# Patient Record
Sex: FEMALE | Race: WHITE | NOT HISPANIC OR LATINO | URBAN - METROPOLITAN AREA
[De-identification: names, ages, dates, MRNs, and addresses within clinical notes are randomized per-mention and may not be internally consistent; named-entity substitution may affect disease eponyms.]

---

## 2018-07-31 ENCOUNTER — NEW PATIENT (OUTPATIENT)
Dept: URBAN - METROPOLITAN AREA CLINIC 73 | Facility: CLINIC | Age: 38
End: 2018-07-31

## 2018-07-31 DIAGNOSIS — H33.021: ICD-10-CM

## 2018-07-31 DIAGNOSIS — H43.813: ICD-10-CM

## 2018-07-31 DIAGNOSIS — H35.412: ICD-10-CM

## 2018-07-31 PROCEDURE — G8427 DOCREV CUR MEDS BY ELIG CLIN: HCPCS

## 2018-07-31 PROCEDURE — 92134 CPTRZ OPH DX IMG PST SGM RTA: CPT

## 2018-07-31 PROCEDURE — 67145 PROPH RTA DTCHMNT PC: CPT

## 2018-07-31 PROCEDURE — 4040F PNEUMOC VAC/ADMIN/RCVD: CPT | Mod: 8P

## 2018-07-31 PROCEDURE — 99244 OFF/OP CNSLTJ NEW/EST MOD 40: CPT

## 2018-07-31 PROCEDURE — 92225 OPHTHALMOSCOPY (INITIAL): CPT

## 2018-07-31 ASSESSMENT — TONOMETRY
OD_IOP_MMHG: 17
OS_IOP_MMHG: 16

## 2018-07-31 ASSESSMENT — VISUAL ACUITY
OD_CC: 3/200
OS_CC: 20/50

## 2018-08-06 ENCOUNTER — SURGERY/PROCEDURE (OUTPATIENT)
Age: 38
End: 2018-08-06

## 2018-08-06 DIAGNOSIS — H33.021: ICD-10-CM

## 2018-08-06 PROCEDURE — 67107 REPAIR DETACHED RETINA: CPT

## 2018-08-07 ENCOUNTER — 1 DAY POST-OP (OUTPATIENT)
Dept: URBAN - METROPOLITAN AREA CLINIC 73 | Facility: CLINIC | Age: 38
End: 2018-08-07

## 2018-08-07 DIAGNOSIS — H33.021: ICD-10-CM

## 2018-08-07 PROCEDURE — 92226 OPHTHALMOSCOPY (SUB): CPT

## 2018-08-07 PROCEDURE — 99024 POSTOP FOLLOW-UP VISIT: CPT

## 2018-08-07 ASSESSMENT — VISUAL ACUITY
OS_SC: 20/30
OS_PH: 20/25
OD_SC: 3/200

## 2018-08-07 ASSESSMENT — TONOMETRY
OS_IOP_MMHG: 17
OD_IOP_MMHG: 19

## 2018-08-14 ENCOUNTER — POST-OP CHECK (OUTPATIENT)
Dept: URBAN - METROPOLITAN AREA CLINIC 73 | Facility: CLINIC | Age: 38
End: 2018-08-14

## 2018-08-14 DIAGNOSIS — H33.021: ICD-10-CM

## 2018-08-14 DIAGNOSIS — H35.412: ICD-10-CM

## 2018-08-14 PROCEDURE — 99024 POSTOP FOLLOW-UP VISIT: CPT

## 2018-08-14 PROCEDURE — 92226 OPHTHALMOSCOPY (SUB): CPT

## 2018-08-14 PROCEDURE — 92134 CPTRZ OPH DX IMG PST SGM RTA: CPT

## 2018-08-14 ASSESSMENT — VISUAL ACUITY
OD_CC: 20/400
OS_CC: 20/30-2
OD_PH: 20/160+1

## 2018-08-14 ASSESSMENT — TONOMETRY
OD_IOP_MMHG: 15
OS_IOP_MMHG: 19

## 2018-09-11 ENCOUNTER — POST-OP CHECK (OUTPATIENT)
Dept: URBAN - METROPOLITAN AREA CLINIC 73 | Facility: CLINIC | Age: 38
End: 2018-09-11

## 2018-09-11 DIAGNOSIS — H33.021: ICD-10-CM

## 2018-09-11 DIAGNOSIS — H35.412: ICD-10-CM

## 2018-09-11 PROCEDURE — 99024 POSTOP FOLLOW-UP VISIT: CPT

## 2018-09-11 PROCEDURE — 92226 OPHTHALMOSCOPY (SUB): CPT

## 2018-09-11 ASSESSMENT — TONOMETRY
OD_IOP_MMHG: 10
OS_IOP_MMHG: 10

## 2018-09-11 ASSESSMENT — VISUAL ACUITY
OD_CC: 20/125-1
OD_PH: 20/100
OS_CC: 20/30-2

## 2018-11-12 ENCOUNTER — HOSPITAL ENCOUNTER (EMERGENCY)
Dept: HOSPITAL 31 - C.ER | Age: 38
Discharge: HOME | End: 2018-11-12
Payer: COMMERCIAL

## 2018-11-12 VITALS — OXYGEN SATURATION: 95 %

## 2018-11-12 VITALS — SYSTOLIC BLOOD PRESSURE: 139 MMHG | DIASTOLIC BLOOD PRESSURE: 96 MMHG | HEART RATE: 72 BPM | RESPIRATION RATE: 17 BRPM

## 2018-11-12 VITALS — BODY MASS INDEX: 27.3 KG/M2

## 2018-11-12 VITALS — TEMPERATURE: 98.9 F

## 2018-11-12 DIAGNOSIS — S63.601A: ICD-10-CM

## 2018-11-12 DIAGNOSIS — Z23: ICD-10-CM

## 2018-11-12 DIAGNOSIS — S60.351A: Primary | ICD-10-CM
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
## 2018-11-12 DIAGNOSIS — Z72.0: ICD-10-CM

## 2018-11-12 DIAGNOSIS — E78.00: ICD-10-CM

## 2018-11-12 DIAGNOSIS — I10: ICD-10-CM

## 2018-11-12 NOTE — C.PDOC
History Of Present Illness





37 yo female come in for evaluation of Right thumb pain, swelling gradually 

developed since yesterday. Pt reports, sustained blunt " non-significant " Right

thumb injury and today woke up with Rt thumb swollen, was unable to remove ring.

Otherwise, pt denies obvious deformity, skin changes, weakness, sensory or 

vascular deficits to injured finger. Ambulate to ED for evaluation, not in any 

apparent distress.


Time Seen by Provider: 11/12/18 08:02


Chief Complaint (Nursing): Abnormal Skin Integrity


History Per: Patient





Past Medical History


Reviewed: Historical Data, Nursing Documentation, Vital Signs


Vital Signs: 





                                Last Vital Signs











Temp  98.9 F   11/12/18 08:04


 


Pulse  91 H  11/12/18 08:04


 


Resp  18   11/12/18 08:04


 


BP      


 


Pulse Ox  95   11/12/18 08:04














- Medical History


PMH: Anxiety, Asthma, HTN, Hypercholesterolemia


   Denies: Diabetes, Hepatitis, HIV, Seizures, Sexually Transmitted Disease


Surgical History: Hernia Repair (2012)





- [facility] Procedures











COLONOSCOPY (07/25/14)


DETOXIFICATION SERVICES FOR SUBSTANCE ABUSE TREATMENT (06/16/16)


GROUP  FOR SUBSTANCE ABUSE TREATMENT, PSYCHOEDUCATION (06/16/16)


TETANUS TOXOID ADMINIST (11/25/14)








Family History: States: Unknown Family Hx





- Social History


Hx Tobacco Use: Yes


Hx Alcohol Use: Yes


Hx Substance Use: No





- Immunization History


Hx Tetanus Toxoid Vaccination: No


Hx Influenza Vaccination: No


Hx Pneumococcal Vaccination: Yes





Review Of Systems


Except As Marked, All Systems Reviewed And Found Negative.


Constitutional: Negative for: Fever, Chills


Musculoskeletal: Positive for: Hand Pain


Skin: Negative for: Rash, Lesions, Bruising


Neurological: Negative for: Weakness, Numbness





Physical Exam





- Physical Exam


Appears: Well, Non-toxic, No Acute Distress


Skin: Normal Color, Warm, No Ecchymosis


Extremity: Normal ROM (Right thumb), Tenderness (diffuse to Righ thumb), 

Capillary Refill (less than 2sec to Right thumb), No Deformity, Swelling 

(diffuse right thimb distally to ring), Other (ring noted to Rigt thumb, tight, 

unable to remove)


Pulses: Right Radial: Normal


Neurological/Psych: Oriented x3, Normal Speech, Normal Motor, Normal Sensation, 

Normal Reflexes





ED Course And Treatment


O2 Sat by Pulse Oximetry: 95 (RA)


Pulse Ox Interpretation: Normal





- Other Rad


  ** X-Ray-Right Thumb


X-Ray: Viewed By Me, Read By Radiologist


Interpretation: Date of service: 11/12/2018.  PROCEDURE:  Right Thumb 

radiographs.  HISTORY:  injury.  COMPARISON:  None.  TECHNIQUE:  AP radiograph 

of the right hand, as well as spot oblique and lateral images of thumb were 

obtained.  FINDINGS:  RIGHT THUMB:  Unremarkable right 1st digit without acute 

displaced fracture identified.  Remainder of the right hand (as seen on the AP 

view) grossly unremarkable.  JOINTS:  No dislocation.  SOFT TISSUES:  Soft 

tissue swelling.  No evidence of radiopaque foreign body.  OTHER FINDINGS:  

None.  IMPRESSION:  Soft tissue swelling.  Otherwise unremarkable study as 

above.


Progress Note: Ring was removed with assistance of ring cutter.  Xray of right 

thumb review (-)aacute fx or dislocation.  On re-eval of Right hand; DIANE of 

Right thumb, no neurovascular deficits.  tetanus given.  Pt advised on care.  

ref. to F/u with PMD in 2-3 days for re-eavl.  return if a ny new chnages.





Disposition


Counseled Patient/Family Regarding: Diagnosis, Need For Followup, Rx Given





- Disposition


Referrals: 


Luis Enrique Castro MD [Staff Provider] - 


Angel Pagan MD [Non-Staff] - 


Disposition: HOME/ ROUTINE


Disposition Time: 09:21


Condition: STABLE


Additional Instructions: 


LIght duty to injured finger


Ibuprofen as need for pain


Follow up with PMD in 2-3 days for re-evaluation.


return to Ed if any worsening or new changes.


Instructions:  Finger Sprain (DC), Foreign Body in Skin


Forms:  [facility] (English)





- Clinical Impression


Clinical Impression: 


 Sprain, finger, Foreign body

## 2018-11-12 NOTE — RAD
Date of service: 11/12/2018



PROCEDURE:  Right Thumb radiographs.



HISTORY:

injury



COMPARISON:

None.



TECHNIQUE:

AP radiograph of the right hand, as well as spot oblique and lateral 

images of thumb were obtained.



FINDINGS:



RIGHT THUMB:

Unremarkable right 1st digit without acute displaced fracture 

identified.  Remainder of the right hand (as seen on the AP view) 

grossly unremarkable.



JOINTS:

No dislocation. 



SOFT TISSUES:

Soft tissue swelling.  No evidence of radiopaque foreign body. 



OTHER FINDINGS:

None.



IMPRESSION:

Soft tissue swelling.  Otherwise unremarkable study as above.

## 2018-11-13 ENCOUNTER — FOLLOW UP (OUTPATIENT)
Dept: URBAN - METROPOLITAN AREA CLINIC 73 | Facility: CLINIC | Age: 38
End: 2018-11-13

## 2018-11-13 DIAGNOSIS — H35.412: ICD-10-CM

## 2018-11-13 DIAGNOSIS — H33.021: ICD-10-CM

## 2018-11-13 PROCEDURE — 4040F PNEUMOC VAC/ADMIN/RCVD: CPT | Mod: 8P

## 2018-11-13 PROCEDURE — 92014 COMPRE OPH EXAM EST PT 1/>: CPT

## 2018-11-13 PROCEDURE — G9902 PT SCRN TBCO AND ID AS USER: HCPCS

## 2018-11-13 PROCEDURE — 92226 OPHTHALMOSCOPY (SUB): CPT

## 2018-11-13 PROCEDURE — 92134 CPTRZ OPH DX IMG PST SGM RTA: CPT

## 2018-11-13 PROCEDURE — G8427 DOCREV CUR MEDS BY ELIG CLIN: HCPCS

## 2018-11-13 ASSESSMENT — VISUAL ACUITY
OD_CC: 20/125
OS_CC: 20/25-2
OD_PH: 20/100

## 2018-11-13 ASSESSMENT — TONOMETRY
OD_IOP_MMHG: 10
OS_IOP_MMHG: 13

## 2019-03-11 ENCOUNTER — HOSPITAL ENCOUNTER (OUTPATIENT)
Dept: HOSPITAL 31 - C.MAMMO | Age: 39
End: 2019-03-11
Payer: COMMERCIAL

## 2019-03-11 DIAGNOSIS — Z12.31: Primary | ICD-10-CM

## (undated) RX ORDER — PREDNISOLONE ACETATE 10 MG/ML: 1 SUSPENSION/ DROPS OPHTHALMIC

## (undated) RX ORDER — CARVEDILOL 6.25 MG/1: 1 TABLET, FILM COATED ORAL